# Patient Record
Sex: MALE | Race: WHITE
[De-identification: names, ages, dates, MRNs, and addresses within clinical notes are randomized per-mention and may not be internally consistent; named-entity substitution may affect disease eponyms.]

---

## 2022-12-08 ENCOUNTER — HOSPITAL ENCOUNTER (EMERGENCY)
Dept: HOSPITAL 26 - MED | Age: 32
Discharge: LEFT BEFORE DISCHARGE | End: 2022-12-08
Payer: SELF-PAY

## 2022-12-08 VITALS — DIASTOLIC BLOOD PRESSURE: 107 MMHG | SYSTOLIC BLOOD PRESSURE: 179 MMHG

## 2022-12-08 DIAGNOSIS — J11.1: Primary | ICD-10-CM

## 2022-12-08 DIAGNOSIS — Z53.21: ICD-10-CM

## 2022-12-08 NOTE — NUR
[id number]. NO SHOW.

-------------------------------------------------------------------------------

Addendum: 12/08/22 at 1121 by [facility]

-------------------------------------------------------------------------------

PATIENT LEFT WITHOUT BEING SEEN BY DR. LARSON. NO FURTHER CARE PROVIDED FOR 
PATIENT.